# Patient Record
Sex: FEMALE | Race: BLACK OR AFRICAN AMERICAN | Employment: FULL TIME | URBAN - METROPOLITAN AREA
[De-identification: names, ages, dates, MRNs, and addresses within clinical notes are randomized per-mention and may not be internally consistent; named-entity substitution may affect disease eponyms.]

---

## 2018-11-27 PROBLEM — E66.01 OBESITY, MORBID (HCC): Status: ACTIVE | Noted: 2018-11-27

## 2018-11-30 PROBLEM — I10 ESSENTIAL HYPERTENSION: Status: ACTIVE | Noted: 2018-11-30

## 2018-11-30 PROBLEM — E03.8 SUBCLINICAL HYPOTHYROIDISM: Status: ACTIVE | Noted: 2018-11-30

## 2018-11-30 PROBLEM — H26.40 SECONDARY CATARACT OF BOTH EYES: Status: ACTIVE | Noted: 2018-11-30

## 2019-05-07 ENCOUNTER — HOSPITAL ENCOUNTER (OUTPATIENT)
Dept: NUTRITION | Age: 54
Discharge: HOME | End: 2019-05-07
Attending: INTERNAL MEDICINE
Payer: COMMERCIAL

## 2019-05-07 PROCEDURE — 97802 MEDICAL NUTRITION INDIV IN: CPT

## 2019-05-07 NOTE — PROGRESS NOTES
NUTRITION ASSESSMENT:    FOOD/NUTRITION HISTORY:   Pt eats 3 meals/day with 1 snack noted. Pt dines out as much as 11 times/month. Pt prepares most meals at home. Pt appears able to obtain appropriate nutritional choices as desired. Food choices as follows:  · low in calcium-containing choices   · low in servings of fruits and vegetables   · low in fiber  · adequatein protein  · unable to determine kcal without portions   · low to none in heart-healthy fats  · low in added sugars    Beverage intake as follows:  2 or more 12 oz glasses water/day  No sweetened tea/day  Occasional sweetened soda/day (with mixed drinks or occasionally dining out  16oz coffee/day (Sugar in the Raw and fat-free half and half)  Alcohol intake noted @ 2-3 glasses of wine or mixed drinks/day (Recommendaitns for women are to limit to 1 serving per day). Activity patterns:  Pt has an exercise regime of none. Notes that her feet / knee issues are limitations. States she does try to walk around during conference calls for which she doesn't require a computer. (Not very regularly)  Pt has a sedentary job. Support:  Pt is  but does not note the level of support. Barriers:  Pt reports a long work day (with stress) that regularly is a 10-12 hour day. Exercise is difficult to fit in per pt (\"Not a morning person and too tired after work. \")    Motivations for change:  · Pt states that's he would like to avoid diabetes and co-morbidities associated with diabetes. States that she does not want a diet program secondary to noting that restricting/eliminating foods she may want to eat leads to noncompliance. · States that she does not eat a lot of fried foods, candy, or sweets. Feels that she makes fairly healthy choices  · Notes she is interested in learning how to develop a healthier eating pattern and lifestyle.     Current Medical Diagnosis:   Hyperglycemia  High cholesterol    History of Current Illness:   Pt is referred by  Adriana Kaufman. Pt states she was discussing options with Dr. Pinky Martinez and decided a referral to nutrition may be helpful. Nutritionally Pertinent Past Medical History:   Obesity  HTN  Subclinical hypothyroidism    Family history of DM type II    Sleep patterns: 5-7 hours/night. Barriers include getting home late, eating late, and not wanting to go to bed on a full stomach. Socioeconomic status/ current living conditions:  Pt is  and employed full time. Medical Procedures, Lab Results, Test Results:   Labs: (3/18/19) HgA1c 6.5    T Cholesterol 230  ALT 54    Nutritionally Relevant Medications:  amlodipine  chlorthalidone  spironolactone  potassium chloride     Supplements/Vitamins/Herbs:   Libertad Eyes  Multivitamin- Omega 3  Fiber Gummy  Citracal  Vegan protein powder    ANTHROPOMETRIC DATA:  Ht: 5'6 (1.676m m)  Wt: 271# (123.2 kg)  BMI: 43.9 (Obesity Class III)  IBW for Ht: 130# +/- 10%  Any recent wt loss or gain: None noted. Estimated Nutritional Needs to Maintain Current Wt using [place]. Jeor Equation, activity factor of 1.2:   2216 kcal/day +/- 10% MSJ margin of error Range (4173-2057 kcal/day)    EER for weight loss: 2000 - 500 = 1500 kcal/day  Carbohydrates:150 gm/day (40% of kcal) or 45 gm/meal(3) with 30 gm/snack(1) (Approximately 450kcal/meal with 200 kcal/snack)  Protein: 94gm/day (25% of kcal)  Fat: 58 gm/day (35% of kcal) Heart- healthy fats emphasized with lists given. Fluids: 1.5 L/day (1 ml/kcal)    NUTRITION DIAGNOSIS:   Excessive oral intake r/t food and nutrition -related knowledge deficit for appropriately balanced meals and portions as evidenced by pt's meal recall (as above)and verbal account of no prior diet education. NUTRITION INTERVENTION:  Appointment length: 60 minutes. INTERVENTIONS:  Motivational Interviewing:   Purpose for motivational interviewing: To determine what steps pt is ready to take regarding strategies for prediabetes.   · Discussed pt's health risks with hyperglycemia and risk for developing type II DM. Explained co-morbidities associated with diabetes. Pt verbalized good understanding. Reviewed motivations for change (as above). Gave client goal setting plan to document action steps pt is ready to take following appt. · Reviewed handout for strategies to address prediabetes including adding physical activity 5 days/week for 30-45 minutes, weight reductions by 5-10%, and reduced kcal, reduced saturated and trans fat meal plan. Pt states she ready to begin steps with balancing meal with recommended portions of CHO, protein, and fat. · Discussed pts intake and activity patterns as above. Reviewed 3 broad areas that impact weight: food choices, physical activity level, and energy balance. Advised that weight management should be viewed as a learning process and making small goals that pt can continue with for life will facilitate weight loss and maintenance. · Discussed topics for wt loss nutrition counseling and the process of weight loss with this program. Advised pt that goals of this program are to educate pt an appropriate intake and nutrition planning for weight management, to address barriers to wt loss, and to develop self- monitoring, self-regulating behaviors for life-long management skills. Advised pt that lifestyle changes will usually be required which pt has noted she is interested in. · Discussed complete health (physical, emotional, social, vocational, intellectual, and spiritual) and how all of these areas affect each other and also can affect eating habits when individual areas of health may need to be addressed. Discussed how her vocational health is impacting her exercise and eating patterns. Encouraged pt assess opportunities at work (lunch break, etc) to incorporate some exercise.   .   Nutrition Education:  Purpose of nutrition education to to provide pt with materials to assist pt in building healthy, macronutrient- balanced (consisitent CHO) meals in appropriate reduced kcal portions to promote weight loss and better blood sugar levels. Also provide for motivation for changing food choices. · Explained how different macronutrients have an impact on blood sugar levels and as in light of sedentary situations. · Reviewed pt's meal recall and showed areas where pt is exceeding CHO portions and omitting healthy fat. · Provided pt with a MyPlate consistent CHO Template meal planner and educated pt on how to use meal plan to meet appropriate portion sizes. Encouraged pt to use meal plan to relearn appropriate portion sizes for present activity level. Advised using portions as a guide and to rely on kcal level if needed when dining out. MyPlate as below: The Plate Method:  · Educated pt on balancing intake of macronutrients at meals and snacks. Discussed the importance of adequate intake of all macronutrients for satiety and satisfaction. · Discussed proportions on plate: 1/2 non-starchy vegetables, 1/4 protein, 1/4 starch. Stressed the importance of healthy fat with every meal (lists given)  · Demonstrated appropriate portion sizes for various food groups and strategies for portion control. · Carbohydrates: Reviewed food groups containing CHO, provided food examples. Encouraged obtaining CHO from a variety of food groups to vary micronutrient intake, choosing less processed carbs with higher fiber. Encouraged decreasing intake of refined CHO foods/beverages with limited nutritional value and/or those high in added sugar. Reviewed list of starchy vegetables. · Protein: Reviewed food groups containing protein, provided food examples. Stressed the importance of adequate protein intake with each meal/snack to promote satiety following meals and maintenance of lean body mass. · Fat: Educated pt on importance of essential fats in diet. Advised of food sources.  Explained the need to increase sources of omega 3 fats and gave products to look for. Educated pt on trans fats and harm caused with consumption. Explained how to find trans fats in ingredient lists verses rather than nutrition fact label. · Explained to pt how to use the meal planner sheets and the meal plan. Pt composed 3 sample meal in office using meal planner. Pt was attentive and asked appropriate questions. Expect that some good attempts will be made to follow the meal plan template- pt stated that he would try to use the meal planner sheets if needed. States that she is likely to stick with something that does not require a lot of effort to measure and track foods. · Also provided articles on antiinflammatory eating plans (The MIND Diet) materials/guidelines to provide more information for decision making regarding food choices.         Goal Setting: Pt agreed to the following goals. Goal: Pt will eat 3 balanced meals per day with appropriate portions of macronutrients. Plan: Pt will use MyPlate Meal Plan along with other materials given as guides and support to structure eating in timing, food choices, macronutrient amounts, and portion sizes for goal stated (as above). Materials Given:  MyPlate Template Meal Plan- Portions for 1500 kcal/day (40%CHO; 25% protein; 35% fat)  MIND Diet food guide  Strategies for Prediabetes   Goal Setting- pt selected action plan form  Fat Basics   Mindful Eating Techniques  Complete Health Details      MONITORING AND EVALUATION DETAILS:  RECOMMENDATIONS FOR CONTINUED APPOINTMENTS/ SUPPORT:  · Pt was attentive and asked pertinent questions during appointment. It appears pt will begin action steps to monitor portions. · Pt would benefit from continued support with appts with dietitian until action steps are established for 6 months. · Frequency depends on any setbacks or need for support.   · Best practice guidelines indicate pts with most success follow up with RD:  regularly, every 2 weeks, especially when beginning new dietary changes (2013 AHA/ACC/TOS Guideline for the Management of Overweight and Obesity in Adults). Follow up appt: 5/31/19 @ 8:00am  Follow-up Monitoring Plans: Will monitor any wt change if pt agrees to weight measurement. Will assess and address any barriers to or success with plans. Will review meal recalls to compare with meal plan.     Yobani Díaz MS, RD,CSSD, LD  W: [phone]  C: [phone]

## 2019-05-31 ENCOUNTER — HOSPITAL ENCOUNTER (OUTPATIENT)
Dept: NUTRITION | Age: 54
Discharge: HOME | End: 2019-05-31
Attending: INTERNAL MEDICINE
Payer: COMMERCIAL

## 2019-05-31 PROCEDURE — 97803 MED NUTRITION INDIV SUBSEQ: CPT

## 2019-05-31 NOTE — PROGRESS NOTES
Problem: Nutrition Deficit   Goal: *Optimize nutritional status     NUTRITION FOLLOW UP:    Monitoring of weight:  Actual BW: Pt declined to weigh. Weight Loss: Unable to determine. ASSESSMENT (of Interventions):   Goal: Pt will eat 3 balanced meals per day with appropriate portions of macronutrients. Plan: Pt will use MyPlate Meal Plan along with other materials given as guides and support to structure eating in timing, food choices, macronutrient amounts, and portion sizes for goal stated (as above). Progress: Pt reports that she has found the MyPlate Template very helpful. States \"it is not complicated\" and notes that she is using it as a means to balance her meals and control portions (as was intended). States that she did travel out of [facility] and [place]" it out. (Pt may travel 2-3 weeks out of the month). Reports that typically traveling has made it hard for her to \"make good choices\" but notes that this time she made a commitment to try to find foods that will be \"better choices\" and will fit the template portions. Breakfast recall includes use of a smoothie she makes, hard boiled eggs, a whole grain cereal, almond milk, granola bar with nuts and with peanut butter on top. Used the template and meal planner sheets to refer patient to portions of food groups that she adds to her smoothie. Advised that pt's present smoothie has 3 choices form the fruit group and advised that choose fruit for one portion of the carb choices will be better for blood sugars. Advised on adding greek yogurt (pt has) and adding nuts for more essential fats. Continued barriers/ New barriers identified:  · No new barriers identified at this appt. · Pt verbalizes good understanding of the food groups presented on the MyPlate template. Appears the template is a good educational tool to address food and nutrition -related knowledge deficit.     NUTRITION DIAGNOSIS: Initial  Excessive oral intake r/t food and nutrition -related knowledge deficit for appropriately balanced meals and portions as evidenced by pt's meal recall (as above)and verbal account of no prior diet education.     INTERVENTION:  Appointment length: 60 minutes. Nutrition Education:  · Reviewed meals as noted above. · Use the MyPlate  template meal plan to review balanced meal that pt can build when she is at a hotel (traveling). · Reviewed the 80/20% approach for choosing foods that will reduce health risks and foods that are treats. · Reviewed concept of consistent controlled carb intake and the recommendations to use whole grains, low glycemic index foods, and controlled fruit portions. Advised the foods that are complex carbs or \"slow carbs (non-starchy vegetables) per pr inquiry. · Reviewed strategy to have protein and fat with carb intake at snack time as well and reviewed examples with foods pt will eat. · Referred pt back to the MIND diet recommendations for daily intake of green leafy vegetables, nuts, using beans for meals (in controlled carb portions), berries, whole grains, fish 2 times/week. · Discussed flexibility to allow for occasional intake of red meat per pt preferences. Motivational Interviewing:  · Discussed pt's viewpoint with the long-term perspective in making these changes to food choices and portions as a new life-long habit. Pt states she feels pretty confident that she can continue with the portion control. States \"it's not too complicated. \"  · Encouraged pt to continue to look for ways to address the barriers to exercise and to discuss with her physician. Pt states that she deidre sto have new orthopedic inserts for her shoes to reduce pain in her knees when walking. States she will be making an appointment to take care of that. Also notes that she looks on [employer] for yoga workouts that do not hurt her knees. Strategies for addressing barriers:  · As discussed in \"Motivational Interviewing. \"    Meals and Snacks:  No changes to MyPlate portions at this time. Goal Setting:  Goal: Pt will eat 3 balanced meals per day with appropriate portions of macronutrients. Plan: Pt will use MyPlate Meal Plan along with other materials given as guides and support to structure eating in timing, food choices, macronutrient amounts, and portion sizes for goal stated (as above). Goal: Pt will exercise aerobically for goal of 45 minutes per day @ 5 days/week. Plan: Pt presently can walk intermittently through her work day or early morning before it is hot (at work). Also notes that she is finding yoga workouts that will help add exercise to her day. No specific number of days agreed upon, but pt verbalizes awareness of the recommendations given on her Pre-diabetes handout. MONITORING/EVALUATION:     RECOMMENDATIONS FOR CONTINUED APPOINTMENTS/ SUPPORT:  · Pt was attentive and asked pertinent questions during appointment. It appears pt will continue with actin steps. · Pt demonstrates behaviors that indicate patient is in stage of change:Action (The Transtheoretical  Model)   · Pt would benefit from continued support with appts with dietitian until action steps are established for 6 months. · Best practice guidelines indicate pts with most success follow up with RD:  regularly, every 2 weeks, especially when beginning new dietary changes (2013 AHA/ACC/TOS Guideline for the Management of Overweight and Obesity in Adults). F/U Appointment Schedule: n/a. Pt declined to schedule. States she \"feels pretty good\" about the present steps she is working on and notes she will call for a f/u appt if she feels she needs it. Plans: Advised pt to call with questions or for a f/u appt. Advised to otherwise follow-up with referring physician for monitoring of prediabetes.     Hemant Peña, MS, RD, CSSD, LD  Outpatient Clinical and Wellness Dietitian  Kb  [phone]

## 2019-07-23 ENCOUNTER — DOCUMENTATION ONLY (OUTPATIENT)
Dept: NUTRITION | Age: 54
End: 2019-07-23

## 2019-07-23 NOTE — PROGRESS NOTES
Nutrition Counseling:  Pt referred by Dr. Adan Ludwig. Pt has not called to schedule a f/u appt. Will close referral on this end.     Van Rodriguez MS, [address], [address] [place] [address] [place], LD  W: [phone]  C: [phone]